# Patient Record
Sex: FEMALE | Race: WHITE | URBAN - METROPOLITAN AREA
[De-identification: names, ages, dates, MRNs, and addresses within clinical notes are randomized per-mention and may not be internally consistent; named-entity substitution may affect disease eponyms.]

---

## 2018-12-22 LAB — RAPID GROUP A STREP (OHS): POSITIVE

## 2022-04-10 ENCOUNTER — HISTORICAL (OUTPATIENT)
Dept: ADMINISTRATIVE | Facility: HOSPITAL | Age: 57
End: 2022-04-10

## 2022-04-26 VITALS
BODY MASS INDEX: 23.21 KG/M2 | SYSTOLIC BLOOD PRESSURE: 117 MMHG | HEIGHT: 65 IN | OXYGEN SATURATION: 98 % | DIASTOLIC BLOOD PRESSURE: 79 MMHG | WEIGHT: 139.31 LBS

## 2022-09-18 ENCOUNTER — HISTORICAL (OUTPATIENT)
Dept: ADMINISTRATIVE | Facility: HOSPITAL | Age: 57
End: 2022-09-18

## 2024-09-05 ENCOUNTER — OFFICE VISIT (OUTPATIENT)
Dept: UROLOGY | Facility: CLINIC | Age: 59
End: 2024-09-05
Payer: OTHER GOVERNMENT

## 2024-09-05 ENCOUNTER — TELEPHONE (OUTPATIENT)
Dept: UROLOGY | Facility: CLINIC | Age: 59
End: 2024-09-05

## 2024-09-05 VITALS — BODY MASS INDEX: 22.89 KG/M2 | HEIGHT: 65 IN | WEIGHT: 137.38 LBS

## 2024-09-05 DIAGNOSIS — R31.0 GROSS HEMATURIA: ICD-10-CM

## 2024-09-05 DIAGNOSIS — N20.1 URETERAL STONE: ICD-10-CM

## 2024-09-05 LAB
BILIRUBIN, UA POC OHS: NEGATIVE
BLOOD, UA POC OHS: ABNORMAL
CLARITY, UA POC OHS: ABNORMAL
COLOR, UA POC OHS: YELLOW
GLUCOSE, UA POC OHS: NEGATIVE
KETONES, UA POC OHS: NEGATIVE
LEUKOCYTES, UA POC OHS: ABNORMAL
NITRITE, UA POC OHS: NEGATIVE
PH, UA POC OHS: 6
PROTEIN, UA POC OHS: NEGATIVE
SPECIFIC GRAVITY, UA POC OHS: 1.02
UROBILINOGEN, UA POC OHS: 0.2

## 2024-09-05 PROCEDURE — 99999PBSHW POCT URINALYSIS(INSTRUMENT): Mod: PBBFAC,,,

## 2024-09-05 PROCEDURE — 87088 URINE BACTERIA CULTURE: CPT

## 2024-09-05 PROCEDURE — 87086 URINE CULTURE/COLONY COUNT: CPT

## 2024-09-05 PROCEDURE — 99999 PR PBB SHADOW E&M-EST. PATIENT-LVL III: CPT | Mod: PBBFAC,,,

## 2024-09-05 PROCEDURE — 99213 OFFICE O/P EST LOW 20 MIN: CPT | Mod: PBBFAC,PO

## 2024-09-05 PROCEDURE — 87147 CULTURE TYPE IMMUNOLOGIC: CPT

## 2024-09-05 PROCEDURE — 81003 URINALYSIS AUTO W/O SCOPE: CPT | Mod: PBBFAC,PO

## 2024-09-05 PROCEDURE — 99203 OFFICE O/P NEW LOW 30 MIN: CPT | Mod: S$PBB,,,

## 2024-09-05 RX ORDER — TAMSULOSIN HYDROCHLORIDE 0.4 MG/1
0.4 CAPSULE ORAL DAILY
Qty: 10 CAPSULE | Refills: 0 | Status: SHIPPED | OUTPATIENT
Start: 2024-09-05 | End: 2024-09-15

## 2024-09-05 RX ORDER — PROMETHAZINE HYDROCHLORIDE 25 MG/1
25 TABLET ORAL EVERY 6 HOURS PRN
COMMUNITY
Start: 2024-08-31 | End: 2024-09-07

## 2024-09-05 RX ORDER — KETOROLAC TROMETHAMINE 10 MG/1
10 TABLET, FILM COATED ORAL EVERY 6 HOURS PRN
COMMUNITY
Start: 2024-08-31 | End: 2024-09-05

## 2024-09-05 RX ORDER — CEFDINIR 300 MG/1
300 CAPSULE ORAL
COMMUNITY
Start: 2024-08-31 | End: 2024-09-07

## 2024-09-05 NOTE — PROGRESS NOTES
[facility] Note  Staff: Michelle Del Angel FNP-C    PCP: DO Shiloh    Chief Complaint: Ureteral Stone    Subjective:        HPI: Hannah Matson is a 58 y.o. female NEW PATIENT presents today for follow up ureteral stone. She states she splits her time in [place] and [place]. She has a urologist in [place] and is followed for stones. She presented to the ED in [place] on 8/31/2024 with flank pain and hematuria. CT RSS showed Kidneys are normal in size, axis and position.  Mild left hydroureteronephrosis is due to a 4 mm distal ureteral stone.  Multiple pelvic phleboliths.  Anteverted uterus.  No free fluid in the pelvis.  No stones in the urinary bladder.  Multiple nonobstructive stones in both collecting systems measuring up to 6 mm.  No right hydronephrosis.s     These images are not able to be reviewed with patient. She was discharged home with toradol, cefdinir, and phenergan. She has a history of stones and has undergone surgical intervention in the past. We discussed adding flomax and straining her urine. She denies dysuria, hematuria, fever, and difficulty urinating. We discussed ED precautions.     Questions asked pt during office visit today:  Urgency:No, incontinence with urgency? No;   DysuriaNo  Gross HematuriaNo  History of UTI: No    History of Kidney Stones?:  Yes    Constipation issues?:  No    REVIEW OF SYSTEMS:  Review of Systems   Constitutional: Negative.  Negative for chills and fever.   Gastrointestinal: Negative.  Negative for abdominal pain, constipation, diarrhea, nausea and vomiting.   Genitourinary: Negative.  Negative for dysuria, flank pain, frequency, hematuria and urgency.   Musculoskeletal:  Positive for back pain.       PMHx:  History reviewed. No pertinent past medical history.    PSHx:  History reviewed. No pertinent surgical history.    Fam Hx:   malignancies: No , gyn malignancies: No   kidney stones: No     Soc Hx:  Lives in  [place]    Allergies:  Morphine (pf) and Cat/feline products    Medications: reviewed     Objective:   There were no vitals filed for this visit.    Physical Exam  Constitutional:       Appearance: Normal appearance.   Pulmonary:      Effort: Pulmonary effort is normal.   Abdominal:      General: There is no distension.      Palpations: Abdomen is soft.      Tenderness: There is no abdominal tenderness. There is no right CVA tenderness or left CVA tenderness.   Musculoskeletal:         General: Normal range of motion.      Cervical back: Normal range of motion.   Skin:     General: Skin is warm.   Neurological:      Mental Status: She is alert and oriented to person, place, and time.   Psychiatric:         Mood and Affect: Mood normal.         Behavior: Behavior normal.         LABS REVIEW:  UA today:   Color:Clear, Yellow  Spec. Grav.  1.025  PH  6.0  Negative for nitrates, protein, glucose, ketones, urobili, bili  Trace blood  Small leuks    Assessment:       1. Gross hematuria    2. Ureteral stone          Plan:      Continue all medications as prescribed  Flomax prescribed to aid in passage and pt given strainer to use    Recommendations:   Increase hydration 2 liters fluid per day.      Strain Urine     Take pain medications as needed     Call office for severe pain or fever >101  Things you can do to decrease your risk of recurring stones:  1. Increase fluid intake - You should be producing at least 2.5 L of urine per 24 hour period. If your urine is dark you need to be drinking more water.  2. Lower sodium intake - this helps lower the amount of calcium being lost in your urine. An ideal intake is between 2300 and 3300mg of sodium daily.  3. Normal calcium diet - ideal intake is between 800 and 1200mg of calcium daily. You do not want to decrease the amount of calcium you take in because this can actually increase your risk of making stone.     Limit iced tea and cesar,  avoid Tums and Rolaids,  to avoid Vitamin C supplementation and to limit salt and red meat intake      F/u per treatment plan    [facility]: Active    Michelle Del Angel, SAMSON-C

## 2024-09-05 NOTE — TELEPHONE ENCOUNTER
----- Message from Stefanie Cabrera sent at 9/5/2024 10:38 AM CDT -----  Type: Needs Medical Advice  Who Called:  pt  Pharmacy name and phone #:    Best Call Back Number: [phone]  Additional Information: pt is calling the office for her culture results, please call pt back to advise, Thanks

## 2024-09-06 LAB — BACTERIA UR CULT: ABNORMAL

## 2024-09-09 ENCOUNTER — PATIENT MESSAGE (OUTPATIENT)
Dept: UROLOGY | Facility: CLINIC | Age: 59
End: 2024-09-09
Payer: OTHER GOVERNMENT

## 2024-09-16 ENCOUNTER — TELEPHONE (OUTPATIENT)
Dept: UROLOGY | Facility: CLINIC | Age: 59
End: 2024-09-16
Payer: OTHER GOVERNMENT

## 2024-09-16 ENCOUNTER — PATIENT MESSAGE (OUTPATIENT)
Dept: UROLOGY | Facility: CLINIC | Age: 59
End: 2024-09-16
Payer: OTHER GOVERNMENT

## 2024-09-16 DIAGNOSIS — N20.0 RENAL STONE: Primary | ICD-10-CM

## 2024-09-16 DIAGNOSIS — N20.1 URETERAL STONE: Primary | ICD-10-CM

## 2024-09-16 DIAGNOSIS — R10.9 FLANK PAIN: ICD-10-CM

## 2024-09-16 RX ORDER — HYDROCODONE BITARTRATE AND ACETAMINOPHEN 5; 325 MG/1; MG/1
1 TABLET ORAL EVERY 6 HOURS PRN
Qty: 21 TABLET | Refills: 0 | Status: SHIPPED | OUTPATIENT
Start: 2024-09-16 | End: 2024-09-23

## 2024-09-16 NOTE — TELEPHONE ENCOUNTER
----- Message from Yasmeen Galloway MA sent at 9/16/2024  9:40 AM CDT -----  Regarding: FW: f/u  Contact: hannah at [phone]  Pt is actively passing a stone and is wanting something for pain as she is in excruciating pain.  ----- Message -----  From: Rupert Valderrama  Sent: 9/16/2024   9:36 AM CDT  To: Christina CHAUDHRY Staff  Subject: f/u                                              Type: Needs Medical Advice    Who Called:  Hannah    Symptoms (please be specific):  actively passing kidney stones    How long has patient had these symptoms:  since LOV    Pharmacy name and phone #:    [id number] [place] - [address]  [address] [address]  Phone: [phone] Fax: [phone]    Best Call Back Number: [phone]    Additional Information: please call w/instructions on how to proceed

## 2024-09-16 NOTE — TELEPHONE ENCOUNTER
----- Message from Michelle Del Angel NP sent at 9/16/2024 11:22 AM CDT -----  Regarding: RE: f/u  Contact: hannah at [phone]  Pain medication sent in, if doesn't control pain, recommend she follow up with her urologist in [place] as discussed  ----- Message -----  From: Yasmeen Galloway MA  Sent: 9/16/2024   9:42 AM CDT  To: Michelle Del Angel NP  Subject: FW: f/u                                          Pt is actively passing a stone and is wanting something for pain as she is in excruciating pain.  ----- Message -----  From: Rupert Valderrama  Sent: 9/16/2024   9:36 AM CDT  To: Christina CHAUDHRY Staff  Subject: f/u                                              Type: Needs Medical Advice    Who Called:  Hannah    Symptoms (please be specific):  actively passing kidney stones    How long has patient had these symptoms:  since LOV    Pharmacy name and phone #:    [id number] [place] [facility] [address] [facility] [address]  [address] [address]  Phone: [phone] Fax: [phone]    Best Call Back Number: [phone]    Additional Information: please call w/instructions on how to proceed

## 2024-09-20 ENCOUNTER — PATIENT MESSAGE (OUTPATIENT)
Dept: UROLOGY | Facility: CLINIC | Age: 59
End: 2024-09-20
Payer: OTHER GOVERNMENT

## 2024-09-20 ENCOUNTER — HOSPITAL ENCOUNTER (OUTPATIENT)
Dept: RADIOLOGY | Facility: HOSPITAL | Age: 59
Discharge: HOME | End: 2024-09-20
Payer: OTHER GOVERNMENT

## 2024-09-20 ENCOUNTER — TELEPHONE (OUTPATIENT)
Dept: UROLOGY | Facility: CLINIC | Age: 59
End: 2024-09-20
Payer: OTHER GOVERNMENT

## 2024-09-20 DIAGNOSIS — R10.9 FLANK PAIN: ICD-10-CM

## 2024-09-20 DIAGNOSIS — N20.0 RENAL STONE: ICD-10-CM

## 2024-09-20 DIAGNOSIS — N20.1 URETERAL STONE: Primary | ICD-10-CM

## 2024-09-20 PROCEDURE — 74176 CT ABD & PELVIS W/O CONTRAST: CPT | Mod: TC,PO

## 2024-09-20 PROCEDURE — 74176 CT ABD & PELVIS W/O CONTRAST: CPT | Mod: 26,,, | Performed by: RADIOLOGY

## 2024-10-07 ENCOUNTER — HOSPITAL ENCOUNTER (OUTPATIENT)
Dept: RADIOLOGY | Facility: HOSPITAL | Age: 59
Discharge: HOME | End: 2024-10-07
Attending: STUDENT IN AN ORGANIZED HEALTH CARE EDUCATION/TRAINING PROGRAM
Payer: OTHER GOVERNMENT

## 2024-10-07 ENCOUNTER — OFFICE VISIT (OUTPATIENT)
Dept: UROLOGY | Facility: CLINIC | Age: 59
End: 2024-10-07
Payer: OTHER GOVERNMENT

## 2024-10-07 VITALS — WEIGHT: 136.69 LBS | HEIGHT: 65 IN | BODY MASS INDEX: 22.77 KG/M2

## 2024-10-07 DIAGNOSIS — N20.1 LEFT URETERAL STONE: ICD-10-CM

## 2024-10-07 DIAGNOSIS — N20.0 KIDNEY STONES: ICD-10-CM

## 2024-10-07 DIAGNOSIS — N20.1 LEFT URETERAL STONE: Primary | ICD-10-CM

## 2024-10-07 LAB
BACTERIA #/AREA URNS HPF: ABNORMAL /HPF
BILIRUBIN, UA POC OHS: NEGATIVE
BLOOD, UA POC OHS: ABNORMAL
CLARITY, UA POC OHS: CLEAR
COLOR, UA POC OHS: YELLOW
GLUCOSE, UA POC OHS: NEGATIVE
KETONES, UA POC OHS: NEGATIVE
LEUKOCYTES, UA POC OHS: ABNORMAL
MICROSCOPIC COMMENT: ABNORMAL
NITRITE, UA POC OHS: NEGATIVE
PH, UA POC OHS: 5.5
PROTEIN, UA POC OHS: NEGATIVE
RBC #/AREA URNS HPF: 2 /HPF (ref 0–4)
SPECIFIC GRAVITY, UA POC OHS: 1.02
SQUAMOUS #/AREA URNS HPF: 1 /HPF
UROBILINOGEN, UA POC OHS: 0.2
WBC #/AREA URNS HPF: 2 /HPF (ref 0–5)

## 2024-10-07 PROCEDURE — 74018 RADEX ABDOMEN 1 VIEW: CPT | Mod: TC,FY,PO

## 2024-10-07 PROCEDURE — 99999 PR PBB SHADOW E&M-EST. PATIENT-LVL III: CPT | Mod: PBBFAC,,, | Performed by: STUDENT IN AN ORGANIZED HEALTH CARE EDUCATION/TRAINING PROGRAM

## 2024-10-07 PROCEDURE — 99213 OFFICE O/P EST LOW 20 MIN: CPT | Mod: PBBFAC,PO,25 | Performed by: STUDENT IN AN ORGANIZED HEALTH CARE EDUCATION/TRAINING PROGRAM

## 2024-10-07 PROCEDURE — 74018 RADEX ABDOMEN 1 VIEW: CPT | Mod: 26,,, | Performed by: RADIOLOGY

## 2024-10-07 PROCEDURE — 76770 US EXAM ABDO BACK WALL COMP: CPT | Mod: 26,,, | Performed by: RADIOLOGY

## 2024-10-07 PROCEDURE — 87086 URINE CULTURE/COLONY COUNT: CPT | Performed by: STUDENT IN AN ORGANIZED HEALTH CARE EDUCATION/TRAINING PROGRAM

## 2024-10-07 PROCEDURE — 81003 URINALYSIS AUTO W/O SCOPE: CPT | Mod: PBBFAC,PO | Performed by: STUDENT IN AN ORGANIZED HEALTH CARE EDUCATION/TRAINING PROGRAM

## 2024-10-07 PROCEDURE — 76770 US EXAM ABDO BACK WALL COMP: CPT | Mod: TC,PO

## 2024-10-07 PROCEDURE — 99214 OFFICE O/P EST MOD 30 MIN: CPT | Mod: S$PBB,,, | Performed by: STUDENT IN AN ORGANIZED HEALTH CARE EDUCATION/TRAINING PROGRAM

## 2024-10-07 PROCEDURE — 99999PBSHW POCT URINALYSIS(INSTRUMENT): Mod: PBBFAC,,,

## 2024-10-07 PROCEDURE — 81000 URINALYSIS NONAUTO W/SCOPE: CPT | Mod: PO | Performed by: STUDENT IN AN ORGANIZED HEALTH CARE EDUCATION/TRAINING PROGRAM

## 2024-10-07 RX ORDER — KETOROLAC TROMETHAMINE 10 MG/1
10 TABLET, FILM COATED ORAL EVERY 6 HOURS
Qty: 20 TABLET | Refills: 0 | Status: SHIPPED | OUTPATIENT
Start: 2024-10-07 | End: 2024-10-12

## 2024-10-07 NOTE — PROGRESS NOTES
"[facility]   Clinic Note    Subjective:     Chief Complaint: stone management    History of Present Illness:  Hannah Matson is a 58 y.o. female who presents to clinic for evaluation and management of kidney stones. She is established to our clinic    She presents with kidney stones. CT from 9/20/24 showed a 5 mm left UVJ stone with proximal hydroureteronephrosis.  She has multiple nonobstructing left renal stones measuring up to 3 mm.  She also has a 5 mm stone in the mid pole of the right kidney in a smaller punctate stone.  No right-sided hydronephrosis.  She brought in a stone for stone in his health since but this was the stone that she had passed prior to the CT scan.  She presents today to discuss further management of her stones.  She does not think she has passed her left ureteral stone.  She still has symptoms however these are relatively atypical for renal colic and reports perineal pain bilaterally.  She has a prescription for Flomax but developed multiple side effects and has stopped taking it.    Past medical, family, surgical and social history reviewed as documented in chart with pertinent positive medical, family, surgical and social history detailed in HPI.    A review systems was conducted with pertinent positive and negative findings documented in HPI.    Objective:     Estimated body mass index is 22.75 kg/m² as calculated from the following:    Height as of this encounter: 5' 5" (1.651 m).    Weight as of this encounter: 62 kg (136 lb 11 oz).    Vital Signs (Most Recent)       Physical Exam  Constitutional:       General: She is not in acute distress.     Appearance: She is well-developed. She is not ill-appearing or toxic-appearing.   Pulmonary:      Effort: Pulmonary effort is normal. No accessory muscle usage or respiratory distress.   Neurological:      Mental Status: She is alert.       Labs reviewed below:  Lab Results   Component Value Date    BUN 23 (H) 08/30/2024    CREATININE " 0.70 08/30/2024    WBC 4.75 08/30/2024    HGB 13.0 08/30/2024    HCT 38.8 08/30/2024     08/30/2024    AST 23 08/30/2024    ALT 13 08/30/2024    ALKPHOS 52 08/30/2024    ALBUMIN 4.4 08/30/2024    HGBA1C 5.1 08/30/2024      Urine dipstick today showed moderate blood, small leukocyte esterase, and negative nitrite.     Assessment:     1. Left ureteral stone    2. Kidney stones      Plan:     We discussed the medical and surgical management of stones and different approaches depending on the stone size and location. We discussed flomax and its indications. We reviewed shockwave lithotripsy, ureteroscopy with laser lithotripsy, or observation. We discussed the risks and benefits to each approach. We discussed expectations and recovery times, and stone free rates. We also discussed the possible need for further procedures regardless of approach.     She would prefer shockwave lithotripsy, however I explained the UVJ stone would be ideally managed with ureteroscopy.  Additionally we discussed the difficulty with treating multifocal stones on the left.  Regardless she is not interested in ureteroscopy for the left renal stones.    Plan for KUB and ultrasound today to evaluate for persistent residual hydronephrosis and radiopacity of the stones.  We will plan for left ureteroscopy to at least address the ureteral stone prior to potential shockwave lithotripsy pending KUB results    Willard Castro MD

## 2024-10-08 ENCOUNTER — PATIENT MESSAGE (OUTPATIENT)
Dept: UROLOGY | Facility: CLINIC | Age: 59
End: 2024-10-08
Payer: OTHER GOVERNMENT

## 2024-10-08 DIAGNOSIS — N20.1 LEFT URETERAL STONE: Primary | ICD-10-CM

## 2024-10-08 LAB
BACTERIA UR CULT: NORMAL
BACTERIA UR CULT: NORMAL

## 2024-10-31 ENCOUNTER — HOSPITAL ENCOUNTER (OUTPATIENT)
Dept: RADIOLOGY | Facility: HOSPITAL | Age: 59
Discharge: HOME | End: 2024-10-31
Attending: STUDENT IN AN ORGANIZED HEALTH CARE EDUCATION/TRAINING PROGRAM
Payer: OTHER GOVERNMENT

## 2024-10-31 DIAGNOSIS — N20.1 LEFT URETERAL STONE: ICD-10-CM

## 2024-10-31 PROCEDURE — 74176 CT ABD & PELVIS W/O CONTRAST: CPT | Mod: TC,PO

## 2024-10-31 PROCEDURE — 74176 CT ABD & PELVIS W/O CONTRAST: CPT | Mod: 26,,, | Performed by: RADIOLOGY
